# Patient Record
Sex: MALE | Race: BLACK OR AFRICAN AMERICAN | NOT HISPANIC OR LATINO | URBAN - METROPOLITAN AREA
[De-identification: names, ages, dates, MRNs, and addresses within clinical notes are randomized per-mention and may not be internally consistent; named-entity substitution may affect disease eponyms.]

---

## 2023-12-28 ENCOUNTER — HOSPITAL ENCOUNTER (EMERGENCY)
Facility: HOSPITAL | Age: 33
Discharge: HOME | End: 2023-12-28
Payer: MEDICAID

## 2023-12-28 VITALS
SYSTOLIC BLOOD PRESSURE: 151 MMHG | HEIGHT: 71 IN | HEART RATE: 74 BPM | WEIGHT: 215 LBS | RESPIRATION RATE: 16 BRPM | OXYGEN SATURATION: 97 % | BODY MASS INDEX: 30.1 KG/M2 | TEMPERATURE: 98.6 F | DIASTOLIC BLOOD PRESSURE: 96 MMHG

## 2023-12-28 DIAGNOSIS — K04.7 DENTAL INFECTION: Primary | ICD-10-CM

## 2023-12-28 PROBLEM — I25.2 HISTORY OF ST ELEVATION MYOCARDIAL INFARCTION (STEMI): Status: ACTIVE | Noted: 2023-12-28

## 2023-12-28 PROCEDURE — 2500000001 HC RX 250 WO HCPCS SELF ADMINISTERED DRUGS (ALT 637 FOR MEDICARE OP)

## 2023-12-28 PROCEDURE — 96372 THER/PROPH/DIAG INJ SC/IM: CPT

## 2023-12-28 PROCEDURE — 99283 EMERGENCY DEPT VISIT LOW MDM: CPT | Mod: 25

## 2023-12-28 PROCEDURE — 99284 EMERGENCY DEPT VISIT MOD MDM: CPT

## 2023-12-28 PROCEDURE — 2500000004 HC RX 250 GENERAL PHARMACY W/ HCPCS (ALT 636 FOR OP/ED)

## 2023-12-28 RX ORDER — AMOXICILLIN AND CLAVULANATE POTASSIUM 875; 125 MG/1; MG/1
1 TABLET, FILM COATED ORAL ONCE
Status: COMPLETED | OUTPATIENT
Start: 2023-12-28 | End: 2023-12-28

## 2023-12-28 RX ORDER — KETOROLAC TROMETHAMINE 30 MG/ML
30 INJECTION, SOLUTION INTRAMUSCULAR; INTRAVENOUS ONCE
Status: COMPLETED | OUTPATIENT
Start: 2023-12-28 | End: 2023-12-28

## 2023-12-28 RX ORDER — NAPROXEN 500 MG/1
500 TABLET ORAL 2 TIMES DAILY PRN
Qty: 30 TABLET | Refills: 0 | Status: SHIPPED | OUTPATIENT
Start: 2023-12-28

## 2023-12-28 RX ORDER — AMOXICILLIN AND CLAVULANATE POTASSIUM 875; 125 MG/1; MG/1
1 TABLET, FILM COATED ORAL EVERY 12 HOURS
Qty: 20 TABLET | Refills: 0 | Status: SHIPPED | OUTPATIENT
Start: 2023-12-28 | End: 2024-01-07

## 2023-12-28 RX ADMIN — KETOROLAC TROMETHAMINE 30 MG: 30 INJECTION, SOLUTION INTRAMUSCULAR; INTRAVENOUS at 13:28

## 2023-12-28 RX ADMIN — AMOXICILLIN AND CLAVULANATE POTASSIUM 875 MG: 875; 125 TABLET, FILM COATED ORAL at 13:28

## 2023-12-28 ASSESSMENT — VISUAL ACUITY
OS: 20/40
OU: 20/30
OD: 20/25

## 2023-12-28 ASSESSMENT — COLUMBIA-SUICIDE SEVERITY RATING SCALE - C-SSRS
2. HAVE YOU ACTUALLY HAD ANY THOUGHTS OF KILLING YOURSELF?: NO
6. HAVE YOU EVER DONE ANYTHING, STARTED TO DO ANYTHING, OR PREPARED TO DO ANYTHING TO END YOUR LIFE?: NO
1. IN THE PAST MONTH, HAVE YOU WISHED YOU WERE DEAD OR WISHED YOU COULD GO TO SLEEP AND NOT WAKE UP?: NO

## 2023-12-28 NOTE — ED PROVIDER NOTES
HPI   Chief Complaint   Patient presents with    Dental Pain       This patient is a 33-year-old male that presents today for dental pain.  Noting for the past day, pain in his left upper jaw with radiation up to his eye.  Feels as if there is buccal mucosal swelling and has been having intermittent chills without notable fever.  Able to control secretions and open his mouth all the way.  Notes that he does have a broken tooth in the left upper jaw which he believes is infected but has not been able to see a dentist.  Notes that prior to arrival, he was having pressure behind his left eye and for a second, noted blurry vision to his eye but the blurriness and pain in his eye have since resolved.  Denies any current vision changes.  Denies vision loss. Believes that it was pain from the tooth radiating up to his eye.                          No data recorded                Patient History   History reviewed. No pertinent past medical history.  History reviewed. No pertinent surgical history.  No family history on file.  Social History     Tobacco Use    Smoking status: Not on file    Smokeless tobacco: Not on file   Substance Use Topics    Alcohol use: Not on file    Drug use: Not on file       Physical Exam   ED Triage Vitals [12/28/23 1235]   Temp Heart Rate Resp BP   37 °C (98.6 °F) 74 16 (!) 151/96      SpO2 Temp Source Heart Rate Source Patient Position   97 % Tympanic -- --      BP Location FiO2 (%)     -- --       Physical Exam  Vitals and nursing note reviewed.   Constitutional:       General: He is not in acute distress.     Appearance: Normal appearance. He is not ill-appearing.   HENT:      Head: Normocephalic and atraumatic.      Right Ear: External ear normal.      Left Ear: External ear normal.      Nose: Nose normal. No congestion or rhinorrhea.      Mouth/Throat:      Mouth: Mucous membranes are moist.      Dentition: Dental tenderness and dental caries present. No gingival swelling, dental abscesses  or gum lesions.      Pharynx: Oropharynx is clear. No oropharyngeal exudate or posterior oropharyngeal erythema.        Comments: Broken tooth and left upper jaw with tenderness on palpation.  Mild amount of buccal mucosal swelling, no obvious dental abscesses, gingival swelling or gum lesions.  Eyes:      General: No visual field deficit.     Extraocular Movements: Extraocular movements intact.      Conjunctiva/sclera: Conjunctivae normal.      Pupils: Pupils are equal, round, and reactive to light.   Cardiovascular:      Rate and Rhythm: Normal rate and regular rhythm.      Heart sounds: Normal heart sounds.   Pulmonary:      Effort: No accessory muscle usage or respiratory distress.      Breath sounds: Normal breath sounds. No wheezing, rhonchi or rales.   Abdominal:      General: Abdomen is flat. Bowel sounds are normal. There is no distension.      Palpations: Abdomen is soft.      Tenderness: There is no abdominal tenderness. There is no right CVA tenderness or left CVA tenderness.   Musculoskeletal:         General: No swelling or deformity. Normal range of motion.      Cervical back: Normal range of motion and neck supple.      Right lower leg: No edema.      Left lower leg: No edema.   Skin:     General: Skin is warm and dry.      Capillary Refill: Capillary refill takes less than 2 seconds.   Neurological:      General: No focal deficit present.      Mental Status: He is alert and oriented to person, place, and time.      GCS: GCS eye subscore is 4. GCS verbal subscore is 5. GCS motor subscore is 6.      Cranial Nerves: Cranial nerves 2-12 are intact. No cranial nerve deficit, dysarthria or facial asymmetry.      Sensory: No sensory deficit.      Motor: Motor function is intact. No weakness.   Psychiatric:         Mood and Affect: Mood and affect normal.         Speech: Speech normal.         Behavior: Behavior normal. Behavior is cooperative.         ED Course & MDM   ED Course as of 12/28/23 1333   u  Dec 28, 2023   1333 Visual acuity test:   Bilateral Distance: 20/30 R Distance: 20/25 L Distance: 20/40 [ML]      ED Course User Index  [ML] Marbella Smith PA-C         Diagnoses as of 12/28/23 1333   Dental infection       Medical Decision Making  This patient is a 33-year-old male presenting today for dental pain.  On exam, he does have some trace notable buccal mucosal swelling on the left side.  On the left upper jaw, has a broken tooth in his premolar that is tender on palpation.  No obvious dental abscesses, no gum lesions or gum swelling.  He reports chills but he is afebrile on arrival.  Notes that the pain started in his tooth and radiated up to his temple behind his eye with associated pressure.  Denies any total vision loss but noted that for a short time, the vision was blurry but has since resolved.  Patient believes that this is due to the pain from his tooth.  Neurologically intact.  Nursing staff performed a visual acuity test performed by nursing staff:   Bilateral Distance: 20/30, R Distance: 20/25, L Distance: 20/40  He notes that the blurry vision has since resolved.  Could be due to pain or migraine causing the blurriness.  Not consistent with amaurosis fugax, he is neurologically intact.  No more pain in his left eye to be concern for iritis and was solely complaining is pressure.  Considering he does have dental infection, likely pressure behind his eye is due to radiation of pain up to eye from jaw.  Will start him on amoxicillin here in the ED and send him home with prescription for it.  I did give him a handout to see a dentist for the dental infection.  I did give him strict return precautions including worsening signs of eye pain and blurriness.  Also given instructions to come back to the ED if his infection worsens.  At this time, patient is stable and okay for discharge.        Procedure  Procedures     Marbella Smith PA-C  12/28/23 1337       Marbella Smith PA-C  12/28/23 1331

## 2023-12-28 NOTE — DISCHARGE INSTRUCTIONS
Please take antibiotics twice a day for 10 days.  Finish taking the antibiotics even if you start feeling better.  You can take naproxen once every 12 hours as needed for pain though do not take it on an empty stomach and do not take this with ibuprofen as it can cause stomach ulcers.  If you notice any fever or chills, worsening pain or any visual abscesses or worsening swelling of your face, please come back to the ED for further evaluation.  Also if you notice that your vision becomes blurry again, please come back to the ER for further evaluation.